# Patient Record
Sex: MALE | Race: WHITE
[De-identification: names, ages, dates, MRNs, and addresses within clinical notes are randomized per-mention and may not be internally consistent; named-entity substitution may affect disease eponyms.]

---

## 2017-12-19 ENCOUNTER — HOSPITAL ENCOUNTER (EMERGENCY)
Dept: HOSPITAL 42 - ED | Age: 47
Discharge: HOME | End: 2017-12-19
Payer: COMMERCIAL

## 2017-12-19 VITALS — OXYGEN SATURATION: 97 % | HEART RATE: 71 BPM | SYSTOLIC BLOOD PRESSURE: 107 MMHG | DIASTOLIC BLOOD PRESSURE: 47 MMHG

## 2017-12-19 VITALS — BODY MASS INDEX: 26.9 KG/M2

## 2017-12-19 VITALS — RESPIRATION RATE: 18 BRPM | TEMPERATURE: 98.9 F

## 2017-12-19 DIAGNOSIS — K29.70: Primary | ICD-10-CM

## 2017-12-19 LAB
ALBUMIN/GLOB SERPL: 1.4 {RATIO} (ref 1.1–1.8)
ALP SERPL-CCNC: 63 U/L (ref 38–126)
ALT SERPL-CCNC: 38 U/L (ref 7–56)
AST SERPL-CCNC: 35 U/L (ref 17–59)
BILIRUB SERPL-MCNC: 0.7 MG/DL (ref 0.2–1.3)
BUN SERPL-MCNC: 26 MG/DL (ref 7–21)
CALCIUM SERPL-MCNC: 9 MG/DL (ref 8.4–10.5)
CHLORIDE SERPL-SCNC: 103 MMOL/L (ref 98–107)
CO2 SERPL-SCNC: 26 MMOL/L (ref 21–33)
ERYTHROCYTE [DISTWIDTH] IN BLOOD BY AUTOMATED COUNT: 13.3 % (ref 11.5–14.5)
GLOBULIN SER-MCNC: 3.1 GM/DL
GLUCOSE SERPL-MCNC: 133 MG/DL (ref 70–110)
HCT VFR BLD CALC: 42.6 % (ref 42–52)
LIPASE SERPL-CCNC: 44 U/L (ref 23–300)
MCH RBC QN AUTO: 30.3 PG (ref 25–35)
MCHC RBC AUTO-ENTMCNC: 33.8 G/DL (ref 31–37)
MCV RBC AUTO: 89.7 FL (ref 80–105)
PLATELET # BLD: 135 10^3/UL (ref 120–450)
PMV BLD AUTO: 12.2 FL (ref 7–11)
POTASSIUM SERPL-SCNC: 3.3 MMOL/L (ref 3.6–5)
PROT SERPL-MCNC: 7.6 G/DL (ref 5.8–8.3)
SODIUM SERPL-SCNC: 140 MMOL/L (ref 132–148)
WBC # BLD AUTO: 9.1 10^3/UL (ref 4.5–11)

## 2017-12-19 PROCEDURE — 96374 THER/PROPH/DIAG INJ IV PUSH: CPT

## 2017-12-19 PROCEDURE — 83690 ASSAY OF LIPASE: CPT

## 2017-12-19 PROCEDURE — 80053 COMPREHEN METABOLIC PANEL: CPT

## 2017-12-19 PROCEDURE — 85027 COMPLETE CBC AUTOMATED: CPT

## 2017-12-19 PROCEDURE — 96375 TX/PRO/DX INJ NEW DRUG ADDON: CPT

## 2017-12-19 PROCEDURE — 99284 EMERGENCY DEPT VISIT MOD MDM: CPT

## 2017-12-19 NOTE — ED PDOC
Arrival/HPI





- General


Chief Complaint: Abdominal Pain


Time Seen by Provider: 12/19/17 02:27


Historian: Patient





- History of Present Illness


Narrative History of Present Illness (Text): 


12/19/17 02:44


Karlo Potter is a 47 year old male, who denies any significant past medical 

history, who presents to the Emergency department complaining of abdominal 

discomfort. Patient states he began experiencing upper abdominal discomfort 

radiating to his back with associated bloating, nausea, and vomiting after 

eating turkey at a restaurant yesterday evening. Patient denies any fever, 

chills, chest pain, shortness of breath, diarrhea, urinary symptoms, neck pain, 

headache, dizziness, or any other complaints.





Symptom Onset: Gradual


Symptom Course: Unchanged


Activities at Onset: Light


Context: Home





Past Medical History





- Provider Review


Nursing Documentation Reviewed: Yes





- Infectious Disease


Hx of Infectious Diseases: None





- Cardiac


Hx Cardiac Disorders: No





- Pulmonary


Hx Respiratory Disorders: No





- Neurological


Hx Vertigo: Yes





- HEENT


Hx HEENT Disorder: No





- Renal


Hx Renal Disorder: No





- Endocrine/Metabolic


Hx Endocrine Disorders: No





- Hematological/Oncological


Hx Blood Disorders: No





- Integumentary


Hx Dermatological Disorder: No





- Musculoskeletal/Rheumatological


Hx Musculoskeletal Disorders: No





- Gastrointestinal


Hx Gastrointestinal Disorders: No





- Genitourinary/Gynecological


Hx Genitourinary Disorders: No





- Psychiatric


Hx Psychophysiologic Disorder: No


Hx Substance Use: Yes





- Anesthesia


Hx Anesthesia: No


Hx Anesthesia Reactions: No


Hx Malignant Hyperthermia: No





Family/Social History





- Physician Review


Nursing Documentation Reviewed: Yes


Family/Social History: Unknown Family HX


Smoking Status: Current Some Days Smoker


Hx Alcohol Use: No


Hx Substance Use: Yes


Substance used: Marijuana





Allergies/Home Meds


Allergies/Adverse Reactions: 


Allergies





penicillamine Allergy (Verified 12/21/16 00:48)


 RASH











Review of Systems





- Physician Review


All systems were reviewed & negative as marked: Yes





- Review of Systems


Constitutional: Normal.  absent: Fevers


Eyes: Normal


ENT: Normal


Respiratory: Normal.  absent: SOB, Cough


Cardiovascular: Normal.  absent: Chest Pain


Gastrointestinal: Abdominal Pain, Nausea, Vomiting.  absent: Diarrhea


Genitourinary Male: Normal.  absent: Dysuria, Frequency, Hematuria, Urinary 

Output Changes


Musculoskeletal: Back Pain.  absent: Neck Pain


Skin: Normal.  absent: Rash


Neurological: Normal.  absent: Headache, Dizziness


Endocrine: Normal


Hemo/Lymphatic: Normal


Psychiatric: Normal





Physical Exam


Vital Signs Reviewed: Yes


Vital Signs











  Temp Pulse Resp BP Pulse Ox


 


 12/19/17 04:14   71  18  107/47 L  97


 


 12/19/17 02:11  98.9 F  90  18  131/57 L  95











Temperature: Afebrile


Blood Pressure: Normal


Pulse: Regular


Respiratory Rate: Normal


Appearance: Positive for: Well-Appearing, Non-Toxic, Comfortable


Pain Distress: None


Mental Status: Positive for: Alert and Oriented X 3





- Systems Exam


Head: Present: Atraumatic, Normocephalic


Pupils: Present: PERRL


Extroacular Muscles: Present: EOMI


Conjunctiva: Present: Normal


Mouth: Present: Moist Mucous Membranes


Neck: Present: Normal Range of Motion


Respiratory/Chest: Present: Clear to Auscultation, Good Air Exchange.  No: 

Respiratory Distress, Accessory Muscle Use


Cardiovascular: Present: Regular Rate and Rhythm, Normal S1, S2.  No: Murmurs


Abdomen: Present: Normal Bowel Sounds.  No: Tenderness, Distention, Peritoneal 

Signs


Back: Present: Normal Inspection


Upper Extremity: Present: Normal Inspection.  No: Cyanosis, Edema


Lower Extremity: Present: Normal Inspection.  No: Edema


Neurological: Present: GCS=15, CN II-XII Intact, Speech Normal


Skin: Present: Warm, Dry, Normal Color.  No: Rashes


Psychiatric: Present: Alert, Oriented x 3, Normal Insight, Normal Concentration





Medical Decision Making


ED Course and Treatment: 


12/19/17 02:44


Impression:


47 year old male complaining of upper abdominal discomfort, nausea, and 

vomiting since yesterday evening.





Plan:


-- Labs, lipase


-- IV fluids


-- Zofran


-- Toradol


-- Pepcid


-- Reassess and disposition





Progress Notes:


12/19/17 05:26


On re-evaluation, patient feels 100% better after medication and is in no acute 

distress. Tolerating PO without difficulty. I have discussed the results and 

plan with the patient, who expresses understanding. Patient in agreement with 

plan to be discharged home. Patient is stable for discharge. Patient was 

instructed to follow up with physician or return if symptoms worsen or new 

concerning symptoms arise.





- Lab Interpretations


Lab Results: 








 12/19/17 02:50 





 12/19/17 02:50 





 Lab Results





12/19/17 02:50: WBC 9.1, RBC 4.75, Hgb 14.4, Hct 42.6, MCV 89.7, MCH 30.3, MCHC 

33.8, RDW 13.3, Plt Count 135, MPV 12.2 H


12/19/17 02:50: Sodium 140, Potassium 3.3 L, Chloride 103, Carbon Dioxide 26, 

Anion Gap 14, BUN 26 H, Creatinine 0.8, Est GFR (African Amer) > 60, Est GFR (

Non-Af Amer) > 60, Random Glucose 133 H, Calcium 9.0, Total Bilirubin 0.7, AST 

35, ALT 38, Alkaline Phosphatase 63, Total Protein 7.6, Albumin 4.4, Globulin 

3.1, Albumin/Globulin Ratio 1.4, Lipase 44








I have reviewed the lab results: Yes





- Medication Orders


Current Medication Orders: 











Discontinued Medications





Famotidine (Pepcid)  20 mg IVP STAT STA


   Stop: 12/19/17 02:45


   Last Admin: 12/19/17 03:00  Dose: 20 mg





IVP Administration


 Document     12/19/17 03:00  CNR  (Rec: 12/19/17 03:00  CNR  [id number]


     Charges for Administration


      # of IVP Administrations                   1





Sodium Chloride (Sodium Chloride 0.9%)  1,000 mls @ 999 mls/hr IV .Q1H1M STA


   Stop: 12/19/17 03:44


   Last Admin: 12/19/17 03:00  Dose: 999 mls/hr





eMAR Start Stop


 Document     12/19/17 03:00  CNR  (Rec: 12/19/17 03:00  CNR  [id number]


     Intravenous Solution


      Start Date                                 12/19/17


      Start Time                                 03:00


      End Date                                   12/19/17


      End time                                   03:30


      Total Infusion Time                        30





Ketorolac Tromethamine (Toradol)  30 mg IVP ONCE ONE


   Stop: 12/19/17 02:45


   Last Admin: 12/19/17 03:00  Dose: 30 mg





MAR Pain Assessment


 Document     12/19/17 03:00  CNR  (Rec: 12/19/17 03:00  CNR  [id number]


     Pain Reassessment


      Is this a pain reassessment?               Yes


IVP Administration


 Document     12/19/17 03:00  CNR  (Rec: 12/19/17 03:00  CNR  [id number]


     Charges for Administration


      # of IVP Administrations                   1





Ondansetron HCl (Zofran Inj)  4 mg IVP ONCE ONE


   Stop: 12/19/17 02:45


   Last Admin: 12/19/17 03:00  Dose: 4 mg





IVP Administration


 Document     12/19/17 03:00  CNR  (Rec: 12/19/17 03:00  CNR  [id number])


     Charges for Administration


      # of IVP Administrations                   1





Potassium Chloride (K-Dur 20 Meq Er Tab)  20 meq PO STAT STA


   Stop: 12/19/17 04:30


   Last Admin: 12/19/17 04:40  Dose: 20 meq











- Scribe Statement


The provider has reviewed the documentation as recorded by the Antonette Fortune





Provider Scribe Attestation:


All medical record entries made by the Scribe were at my direction and 

personally dictated by me. I have reviewed the chart and agree that the record 

accurately reflects my personal performance of the history, physical exam, 

medical decision making, and the department course for this patient. I have 

also personally directed, reviewed, and agree with the discharge instructions 

and disposition.








Disposition/Present on Arrival





- Present on Arrival


Any Indicators Present on Arrival: No


History of DVT/PE: No


History of Uncontrolled Diabetes: No


Urinary Catheter: No


History of Decub. Ulcer: No


History Surgical Site Infection Following: None





- Disposition


Have Diagnosis and Disposition been Completed?: Yes


Diagnosis: 


 Gastritis





Disposition: HOME/ ROUTINE


Disposition Time: 05:26


Patient Plan: Discharge


Patient Problems: 


 Current Active Problems











Problem Status Onset


 


Gastritis Acute  











Condition: GOOD


Discharge Instructions (ExitCare):  Gastritis (ED)


Additional Instructions: 


Take meds as prescribed/follow up with your doctor this week


Prescriptions: 


Phenobarb/Hyoscy/Atropine/Scop [Donnatal Tablet] 16.2 mg PO Q6 PRN #12 tablet


 PRN Reason: Dyspepsia


Ondansetron [Zofran Odt] 4 mg PO Q8 PRN #12 odt


 PRN Reason: Nausea/Vomiting


Forms:  [employer] (English)

## 2018-07-31 ENCOUNTER — HOSPITAL ENCOUNTER (EMERGENCY)
Dept: HOSPITAL 42 - ED | Age: 48
LOS: 1 days | Discharge: HOME | End: 2018-08-01
Payer: COMMERCIAL

## 2018-07-31 VITALS — TEMPERATURE: 98.2 F | RESPIRATION RATE: 18 BRPM

## 2018-07-31 VITALS — BODY MASS INDEX: 24.2 KG/M2

## 2018-07-31 DIAGNOSIS — M54.17: ICD-10-CM

## 2018-07-31 DIAGNOSIS — M54.12: Primary | ICD-10-CM

## 2018-07-31 PROCEDURE — 96372 THER/PROPH/DIAG INJ SC/IM: CPT

## 2018-07-31 PROCEDURE — 72050 X-RAY EXAM NECK SPINE 4/5VWS: CPT

## 2018-07-31 PROCEDURE — 72110 X-RAY EXAM L-2 SPINE 4/>VWS: CPT

## 2018-07-31 PROCEDURE — 99283 EMERGENCY DEPT VISIT LOW MDM: CPT

## 2018-07-31 NOTE — ED PDOC
Arrival/HPI





- General


Chief Complaint: Upper Extremity Problem/Injury


Time Seen by Provider: 07/31/18 22:55


Historian: Patient





- History of Present Illness


Narrative History of Present Illness (Text): 





07/31/18 22:57





A 47 year old male, with no significant past medical history, presents to the 

emergency department with a complaint of 2 month duration left-sided neck, arm, 

and leg pain and numbness. Patient reports that he sustained an injury to his 

lower back at work after lifting something 7-8 months ago for which he was seen 

by a chiropractor. Patient states that this current episode of pain started 2 

days ago and has taken Motrin with minimal relief of his symptoms. The patient 

denies fevers, chills, headache, weakness, chest pain, shortness of breath, 

dyspnea on exertion, cough, abdominal pain, nausea, vomiting, diarrhea, urinary/

bowel changes, or any other complaint.





  





Time/Duration: Other (2 months)


Symptom Onset: Gradual


Symptom Course: Unchanged


Activities at Onset: Rest, Light


Context: Home





Past Medical History





- Provider Review


Nursing Documentation Reviewed: Yes





- Infectious Disease


Hx of Infectious Diseases: None





- Cardiac


Hx Cardiac Disorders: No





- Pulmonary


Hx Respiratory Disorders: No





- Neurological


Hx Neurological Disorder: Yes


Hx Vertigo: Yes





- HEENT


Hx HEENT Disorder: No





- Renal


Hx Renal Disorder: No





- Endocrine/Metabolic


Hx Endocrine Disorders: No





- Hematological/Oncological


Hx Blood Disorders: No





- Integumentary


Hx Dermatological Disorder: No





- Musculoskeletal/Rheumatological


Hx Musculoskeletal Disorders: No





- Gastrointestinal


Hx Gastrointestinal Disorders: No





- Genitourinary/Gynecological


Hx Genitourinary Disorders: No





- Psychiatric


Hx Psychophysiologic Disorder: No


Hx Substance Use: Yes





- Anesthesia


Hx Anesthesia: No


Hx Anesthesia Reactions: No


Hx Malignant Hyperthermia: No





Family/Social History





- Physician Review


Nursing Documentation Reviewed: Yes


Family/Social History: No Known Family HX


Smoking Status: Current Some Days Smoker


Hx Alcohol Use: No


Hx Substance Use: Yes


Substance used: Marijuana





Allergies/Home Meds


Allergies/Adverse Reactions: 


Allergies





Penicillins Allergy (Verified 07/31/18 22:20)


 RASH











Review of Systems





- Physician Review


All systems were reviewed & negative as marked: Yes





- Review of Systems


Constitutional: absent: Fevers, Night Sweats


Respiratory: absent: SOB, Cough


Cardiovascular: absent: Chest Pain, ADAIR


Gastrointestinal: absent: Abdominal Pain, Stool Changes, Diarrhea, Nausea, 

Vomiting


Genitourinary Male: absent: Urinary Output Changes


Musculoskeletal: Neck Pain (Left- sided neck pain and numbness that radiated 

down left upper and lower extremities.)


Neurological: absent: Headache





Physical Exam





- Physical Exam


Narrative Physical Exam (Text): 





07/31/18 23:16


Gen: VS reviewed, alert, well developed, well nourished, nontoxic, mild 

distress.


ENT: Normal pharynx.


Eye: EOMI, PERRL.


Neck: No JVD, supple, no adenopathy. Limited ROM on rotation, flexion, and 

extension of neck secondary to pain. No tenderness on palpation. 


CV: Regular rate, regular rhythm, no rubs, no murmur, no gallops, S1, S2, 

pulses equal and strong.


Pulm: No distress, clear to auscultation, no wheeze, no rhonchi, breath sounds 

equal, no rales.


Abd: Soft, nontender, no guarding, no rebound, no rigidity, normal bowel 

sounds. 


Ext: No edema. Motor 5/5 of all 4 extremities. 


Skin: Good color, no rash, no cyanosis.


Psych: Responds appropriately to questions, normal affect.


Neuro: Oriented x 3, CN2-12 intact grossly, motor intact. Diminished sensation 

in the left posterior arm, posterior hand, dorsum of the foot. 





Vital Signs Reviewed: Yes


Vital Signs











  Temp Pulse Resp BP Pulse Ox


 


 08/01/18 01:18   66  18  120/75  98


 


 07/31/18 22:21  98.2 F  61  18  123/77  96











Temperature: Afebrile


Blood Pressure: Normal


Pulse: Regular


Respiratory Rate: Normal


Appearance: Positive for: Well-Appearing, Non-Toxic, Comfortable


Pain Distress: None


Mental Status: Positive for: Alert and Oriented X 3





Medical Decision Making


ED Course and Treatment: 





07/31/18 23:19





Impression:


A 47 year old male presents to the emergency department  with complaint of 2 

month duration neck, arm, and leg numbness worsening over the past 2 days.  





Plan:


-- Cervical/ Lumbar Spine X-Rays


-- Tylenol, Flexeril, Decadron, and Toradol


-- Reassess and disposition








Progress Notes:








08/01/18 00:57: 


On re-evaluation, patient reports improvement of his symptoms, but notes his 

symptoms are still there.  I have discussed the results and plan with the 

patient, who expresses understanding. Patient in agreement with plan to be 

discharged home. Patient is stable for discharge. Patient was instructed to 

follow up with PMD for further treatment and MRI or return if symptoms worsen 

or new concerning symptoms arise.








08/01/18 01:01


patient presents to the Ed with simultaneous cervical and lumbar radicular 

pain. pain mostly in the left cervical region. there is no hx of trauma, no hx 

ivda, no hx cancer, no weakness, patient has longstanding numbness for several 

weaks now. xray were done to eval for acute bony pathology. patient has been 

informed and is agreeable to proposed plan for discharged and oupt follow up 

where he would need MRI of both the cervical and lumbar region. patient 

remained stable throughout ED course, did exhibit improvement in symptoms.





- RAD Interpretation


Narrative RAD Interpretations (Text): 





08/01/18 00:53 Cervical Spine X-Ray read and interpreted by me shows 

straightening of the C-spine and multi-level DJD. Lumbar Spine X-Ray shows no 

fracture. 


Radiology Orders: 








07/31/18 23:00


CERVICAL SPINE >18YR W/OBLIQUE [RAD] Stat 





07/31/18 23:01


LS SPINE WITH OBL > 18 YRS OLD [RAD] Stat 














- Medication Orders


Current Medication Orders: 











Discontinued Medications





Acetaminophen (Tylenol 325mg Tab)  975 mg PO STAT STA


   Stop: 07/31/18 23:02


   Last Admin: 07/31/18 23:19  Dose: 975 mg





MAR Pain/Vitals


 Document     07/31/18 23:19  SS  (Rec: 07/31/18 23:24  SS  [id number])


     Pain Reassessment


      Is This A Pain ReAssessment?               No


     Sleep


      Is patient sleeping during reassessment?   No


     Presence of Pain


      Presence of Pain                           Yes


     Pain Scale Used


      Pain Scale Used                            Numeric


     Location


      Pain Location Body Site                    Back


      Intensity                                  10


      Scale Used                                 Numeric





Cyclobenzaprine HCl (Flexeril)  5 mg PO STAT STA


   Stop: 07/31/18 23:02


   Last Admin: 07/31/18 23:19  Dose: 5 mg





Dexamethasone (Decadron Inj)  10 mg IM STAT STA


   Stop: 07/31/18 23:02


   Last Admin: 07/31/18 23:24  Dose: 10 mg





IM Administration Charges


 Document     07/31/18 23:24  SS  (Rec: 07/31/18 23:25  SS  [id number])


     Injection Site


      MAR Injection Site                         Left Vastus Lateralis


     Charges for Administration


      # of IM Administrations                    1





Ketorolac Tromethamine (Toradol)  60 mg IM STAT STA


   Stop: 07/31/18 23:07


   Last Admin: 07/31/18 23:24  Dose: 60 mg





MAR Pain Assessment


 Document     07/31/18 23:24  SS  (Rec: 07/31/18 23:24  SS  [id number])


     Pain Reassessment


      Is this a pain reassessment?               No


     Presence of Pain


      Presence of Pain                           Yes


     Pain Scale Used


      Pain Scale Used                            Numeric


     Description


      Description                                Constant


      Intensity of Pain at present               10


IM Administration Charges


 Document     07/31/18 23:24  SS  (Rec: 07/31/18 23:24  SS  [id number])


     Injection Site


      MAR Injection Site                         Left Deltoid


     Charges for Administration


      # of IM Administrations                    1














- Scribe Statement


The provider has reviewed the documentation as recorded by the Scribe





Maddi Zimmer 





Provider Scribe Attestation:


All medical record entries made by the Scribe were at my direction and 

personally dictated by me. I have reviewed the chart and agree that the record 

accurately reflects my personal performance of the history, physical exam, 

medical decision making, and the department course for this patient. I have 

also personally directed, reviewed, and agree with the discharge instructions 

and disposition.








Disposition/Present on Arrival





- Present on Arrival


Any Indicators Present on Arrival: No


History of DVT/PE: No


History of Uncontrolled Diabetes: No


Urinary Catheter: No


History of Decub. Ulcer: No


History Surgical Site Infection Following: None





- Disposition


Have Diagnosis and Disposition been Completed?: Yes


Diagnosis: 


 Radiculopathy of cervical spine, Radicular pain of lumbosacral region





Disposition: HOME/ ROUTINE


Disposition Time: 01:01


Condition: STABLE


Discharge Instructions (ExitCare):  Radiculopathy


Print Language: ENGLISH


Additional Instructions: 





ZOIE GALLARDO, thank you for letting us take care of you today. Your 

provider was Dr. Krystian Morrow and you were treated for radiculopathy of the 

neck and radiculopathy of the low back. The emergency medical care you received 

today was directed at your acute symptoms. If you were prescribed any medication

, please fill it and take as directed. It may take several days for your 

symptoms to resolve. Return to the Emergency Department if your symptoms worsen

, do not improve, or if you have any other problems.





Please contact your doctor or call one of the physicians/clinics you have been 

referred to that are listed on the Patient Visit Information form that is 

included in your discharge packet. Bring any paperwork you were given at 

discharge with you along with any medications you are taking to your follow up 

visit. Our treatment cannot replace ongoing medical care by a primary care 

provider outside of the emergency department.





Thank you for allowing the [facility] team to be part of your care today.








If you had an X-Ray or CT scan: A Radiologist will review the ED reading if any 

change in treatment is needed we will contact you.***





If you had a blood, urine, or wound culture: It will take several days for the 

results, if any change in treatment is needed we will contact you.***





If you had an STI test: It will take 48 hours for the results. Please call 

after 1 week if you have not heard back.***


Prescriptions: 


Cyclobenzaprine [Flexeril] 5 mg PO TID #15 tab


Ibuprofen [Motrin Tab] 600 mg PO QID #42 tab


Prednisone [Deltasone] 20 mg PO DAILY 7 Days #14 tablet


Forms:  [facility] (English), WORK NOTE

## 2018-08-01 VITALS — HEART RATE: 66 BPM | OXYGEN SATURATION: 98 % | DIASTOLIC BLOOD PRESSURE: 75 MMHG | SYSTOLIC BLOOD PRESSURE: 120 MMHG

## 2018-08-01 NOTE — RAD
Date of service: 



08/01/2018



PROCEDURE:  Radiographs of the Lumbar Spine.



HISTORY:

radicular pain







COMPARISON:

No prior.



FINDINGS:



BONES:

Normal alignment. No listhesis. No fracture.



DISC SPACES:

Unremarkable.



OTHER FINDINGS:

None.



IMPRESSION:

Unremarkable radiographs of the lumbar spine.

## 2018-08-01 NOTE — RAD
Date of service: 



08/01/2018



PROCEDURE:  Cervical Spine Radiographs.



HISTORY:

Pain. 



COMPARISON:

None. 



FINDINGS:



BONES:

Alignment maintained. No fracture.  Dens Intact. 



DISC SPACES:

Normal. 



SOFT TISSUES:

Normal. No prevertebral soft tissue swelling. 



OTHER FINDINGS:

None.



IMPRESSION:

Normal cervical spine radiographs

## 2020-02-07 ENCOUNTER — TRANSCRIPTION ENCOUNTER (OUTPATIENT)
Age: 50
End: 2020-02-07

## 2021-06-30 ENCOUNTER — TRANSCRIPTION ENCOUNTER (OUTPATIENT)
Age: 51
End: 2021-06-30

## 2021-07-02 ENCOUNTER — TRANSCRIPTION ENCOUNTER (OUTPATIENT)
Age: 51
End: 2021-07-02

## 2021-07-14 ENCOUNTER — APPOINTMENT (OUTPATIENT)
Dept: UROLOGY | Facility: CLINIC | Age: 51
End: 2021-07-14
Payer: COMMERCIAL

## 2021-07-14 ENCOUNTER — NON-APPOINTMENT (OUTPATIENT)
Age: 51
End: 2021-07-14

## 2021-07-14 VITALS
DIASTOLIC BLOOD PRESSURE: 69 MMHG | SYSTOLIC BLOOD PRESSURE: 109 MMHG | TEMPERATURE: 97.9 F | BODY MASS INDEX: 25.92 KG/M2 | HEIGHT: 69 IN | HEART RATE: 69 BPM | WEIGHT: 175 LBS

## 2021-07-14 DIAGNOSIS — Z80.0 FAMILY HISTORY OF MALIGNANT NEOPLASM OF DIGESTIVE ORGANS: ICD-10-CM

## 2021-07-14 DIAGNOSIS — Z83.3 FAMILY HISTORY OF DIABETES MELLITUS: ICD-10-CM

## 2021-07-14 PROCEDURE — 99072 ADDL SUPL MATRL&STAF TM PHE: CPT

## 2021-07-14 PROCEDURE — 99203 OFFICE O/P NEW LOW 30 MIN: CPT

## 2021-07-14 NOTE — PHYSICAL EXAM
[General Appearance - Well Developed] : well developed [General Appearance - Well Nourished] : well nourished [Normal Appearance] : normal appearance [Heart Rate And Rhythm] : Heart rate and rhythm were normal [] : no respiratory distress [Exaggerated Use Of Accessory Muscles For Inspiration] : no accessory muscle use [Abdomen Soft] : soft [Penis Abnormality] : normal uncircumcised penis [Scrotum] : the scrotum was normal [Testes Tenderness] : no tenderness of the testes [Testes Mass (___cm)] : there were no testicular masses [Prostate Tenderness] : the prostate was not tender [Prostate Size ___ gm] : prostate size [unfilled] gm [Normal Station and Gait] : the gait and station were normal for the patient's age [Skin Color & Pigmentation] : normal skin color and pigmentation [No Focal Deficits] : no focal deficits [Sensation] : the sensory exam was normal to light touch and pinprick [Oriented To Time, Place, And Person] : oriented to person, place, and time [Not Anxious] : not anxious [No Palpable Adenopathy] : no palpable adenopathy

## 2021-07-15 ENCOUNTER — NON-APPOINTMENT (OUTPATIENT)
Age: 51
End: 2021-07-15

## 2021-07-15 LAB
APPEARANCE: CLEAR
BACTERIA: NEGATIVE
BILIRUBIN URINE: NEGATIVE
BLOOD URINE: NEGATIVE
COLOR: NORMAL
GLUCOSE QUALITATIVE U: NEGATIVE
HYALINE CASTS: 0 /LPF
KETONES URINE: NEGATIVE
LEUKOCYTE ESTERASE URINE: NEGATIVE
MICROSCOPIC-UA: NORMAL
NITRITE URINE: NEGATIVE
PH URINE: 7.5
PROTEIN URINE: NEGATIVE
RED BLOOD CELLS URINE: 0 /HPF
SPECIFIC GRAVITY URINE: 1.01
SQUAMOUS EPITHELIAL CELLS: 0 /HPF
UROBILINOGEN URINE: NORMAL
WHITE BLOOD CELLS URINE: 0 /HPF

## 2021-07-15 NOTE — HISTORY OF PRESENT ILLNESS
[Dysuria] : dysuria [FreeTextEntry1] : 49 yo M with h/o burning in perineum, dysuria and rectal discomfort for past two months. Was seen at urgent care where UA was negative at the time. No other symptoms. No significant LUTS otherwise. Nocturia 1x nightly, not bothersome. Symptoms worse with bladder irritants such as spicy or acidic foods. Mostly drinks water. Started on cipro for one month by [facility] for prostatitis which he has been taking for past two weeks, with significant improvement in symptoms. No fevers/chills or other issues. [Urinary Incontinence] : no urinary incontinence [Urinary Retention] : no urinary retention [Urinary Urgency] : no urinary urgency [Urinary Frequency] : no urinary frequency [Nocturia] : no nocturia [Hematuria - Gross] : no gross hematuria [Bladder Spasm] : no bladder spasm [Abdominal Pain] : no abdominal pain [Flank Pain] : no flank pain

## 2021-07-15 NOTE — END OF VISIT
[FreeTextEntry3] : 51yo male with pelvic pain, urine testing negative, exam is normal. Reviewed etiology of pelvic pain and recommend conservative management at this point. F/u 3 months.

## 2021-07-15 NOTE — ASSESSMENT
[FreeTextEntry1] : 49 yo M with dysuria, perineal burning\par -previous UA negative\par -symptoms likely more c/w pelvic floor dysfunction/pain\par -advised to stop taking antibiotics when symptoms resolve\par -UA/culture today\par -advised to take warm bathes, try advil or other NSAIDs for symptom relief\par -f/u in 3 months

## 2021-07-16 LAB — BACTERIA UR CULT: NORMAL

## 2021-10-13 ENCOUNTER — TRANSCRIPTION ENCOUNTER (OUTPATIENT)
Age: 51
End: 2021-10-13

## 2021-10-13 ENCOUNTER — NON-APPOINTMENT (OUTPATIENT)
Age: 51
End: 2021-10-13

## 2021-10-19 ENCOUNTER — NON-APPOINTMENT (OUTPATIENT)
Age: 51
End: 2021-10-19

## 2021-10-20 ENCOUNTER — APPOINTMENT (OUTPATIENT)
Dept: UROLOGY | Facility: CLINIC | Age: 51
End: 2021-10-20
Payer: COMMERCIAL

## 2021-10-20 VITALS — TEMPERATURE: 98.7 F | SYSTOLIC BLOOD PRESSURE: 127 MMHG | DIASTOLIC BLOOD PRESSURE: 78 MMHG | HEART RATE: 73 BPM

## 2021-10-20 DIAGNOSIS — R10.2 PELVIC AND PERINEAL PAIN: ICD-10-CM

## 2021-10-20 DIAGNOSIS — Z00.00 ENCOUNTER FOR GENERAL ADULT MEDICAL EXAMINATION W/OUT ABNORMAL FINDINGS: ICD-10-CM

## 2021-10-20 PROCEDURE — 99213 OFFICE O/P EST LOW 20 MIN: CPT

## 2021-10-20 NOTE — ASSESSMENT
[FreeTextEntry1] : 52 yo M with dysuria, perineal burning\par -previous UA negative\par -symptoms likely more c/w pelvic floor dysfunction/pain\par -UA, PSA\par -advised to take warm bathes, try advil or other NSAIDs for symptom relief\par -recommend pelvic floor PT\par -f/u in 3 months

## 2021-10-20 NOTE — HISTORY OF PRESENT ILLNESS
[Urinary Urgency] : urinary urgency [Urinary Frequency] : urinary frequency [Dysuria] : dysuria [FreeTextEntry1] : 49 yo M with h/o burning in perineum, dysuria and rectal discomfort for past two months. Was seen at urgent care where UA was negative at the time. No other symptoms. No significant LUTS otherwise. Nocturia 1x nightly, not bothersome. Symptoms worse with bladder irritants such as spicy or acidic foods. Mostly drinks water. Started on cipro for one month by [facility] for prostatitis which he has been taking for past two weeks, with significant improvement in symptoms. No fevers/chills or other issues.\par \par 10/20/21 Here for f/u. No improvement in pelvic pain symptoms. Also with frequency, nocturia. Symptoms do improve with conservative measures. Last UA negative.  [Urinary Incontinence] : no urinary incontinence [Urinary Retention] : no urinary retention [Nocturia] : no nocturia [Hematuria - Gross] : no gross hematuria [Bladder Spasm] : no bladder spasm [Abdominal Pain] : no abdominal pain [Flank Pain] : no flank pain

## 2021-10-20 NOTE — PHYSICAL EXAM
[General Appearance - Well Developed] : well developed [General Appearance - Well Nourished] : well nourished [Normal Appearance] : normal appearance [Well Groomed] : well groomed [General Appearance - In No Acute Distress] : no acute distress [Abdomen Soft] : soft [Abdomen Tenderness] : non-tender [Costovertebral Angle Tenderness] : no ~M costovertebral angle tenderness [Prostate Enlargement] : the prostate was not enlarged [Prostate Tenderness] : the prostate was not tender [No Prostate Nodules] : no prostate nodules

## 2021-10-21 LAB
APPEARANCE: CLEAR
BACTERIA: NEGATIVE
BILIRUBIN URINE: NEGATIVE
BLOOD URINE: NEGATIVE
COLOR: COLORLESS
GLUCOSE QUALITATIVE U: NEGATIVE
HYALINE CASTS: 0 /LPF
KETONES URINE: NEGATIVE
LEUKOCYTE ESTERASE URINE: NEGATIVE
MICROSCOPIC-UA: NORMAL
NITRITE URINE: NEGATIVE
PH URINE: 6
PROTEIN URINE: NEGATIVE
PSA SERPL-MCNC: 3.77 NG/ML
RED BLOOD CELLS URINE: 0 /HPF
SPECIFIC GRAVITY URINE: 1.01
SQUAMOUS EPITHELIAL CELLS: 0 /HPF
UROBILINOGEN URINE: NORMAL
WHITE BLOOD CELLS URINE: 0 /HPF

## 2021-10-25 ENCOUNTER — NON-APPOINTMENT (OUTPATIENT)
Age: 51
End: 2021-10-25

## 2021-10-25 DIAGNOSIS — R97.20 ELEVATED PROSTATE, SPECIFIC ANTIGEN [PSA]: ICD-10-CM

## 2021-11-08 ENCOUNTER — OUTPATIENT (OUTPATIENT)
Dept: OUTPATIENT SERVICES | Facility: HOSPITAL | Age: 51
LOS: 1 days | End: 2021-11-08
Payer: COMMERCIAL

## 2021-11-08 ENCOUNTER — RESULT REVIEW (OUTPATIENT)
Age: 51
End: 2021-11-08

## 2021-11-08 ENCOUNTER — APPOINTMENT (OUTPATIENT)
Dept: MRI IMAGING | Facility: HOSPITAL | Age: 51
End: 2021-11-08

## 2021-11-08 PROCEDURE — A9585: CPT

## 2021-11-08 PROCEDURE — 72197 MRI PELVIS W/O & W/DYE: CPT | Mod: 26

## 2021-11-08 PROCEDURE — 72197 MRI PELVIS W/O & W/DYE: CPT

## 2021-11-09 ENCOUNTER — NON-APPOINTMENT (OUTPATIENT)
Age: 51
End: 2021-11-09

## 2022-02-09 ENCOUNTER — APPOINTMENT (OUTPATIENT)
Dept: UROLOGY | Facility: CLINIC | Age: 52
End: 2022-02-09